# Patient Record
Sex: FEMALE | Race: OTHER
[De-identification: names, ages, dates, MRNs, and addresses within clinical notes are randomized per-mention and may not be internally consistent; named-entity substitution may affect disease eponyms.]

---
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
## 2018-04-29 ENCOUNTER — HOSPITAL ENCOUNTER (EMERGENCY)
Dept: HOSPITAL 62 - ER | Age: 18
Discharge: HOME | End: 2018-04-29
Payer: SELF-PAY

## 2018-04-29 VITALS — SYSTOLIC BLOOD PRESSURE: 109 MMHG | DIASTOLIC BLOOD PRESSURE: 75 MMHG

## 2018-04-29 DIAGNOSIS — R10.2: Primary | ICD-10-CM

## 2018-04-29 DIAGNOSIS — N93.9: ICD-10-CM

## 2018-04-29 DIAGNOSIS — E34.3: ICD-10-CM

## 2018-04-29 DIAGNOSIS — R07.9: ICD-10-CM

## 2018-04-29 DIAGNOSIS — N89.8: ICD-10-CM

## 2018-04-29 DIAGNOSIS — R10.9: ICD-10-CM

## 2018-04-29 LAB
ADD MANUAL DIFF: NO
ALBUMIN SERPL-MCNC: 4.7 G/DL (ref 3.7–5.6)
ALP SERPL-CCNC: 60 U/L (ref 50–135)
ALT SERPL-CCNC: 24 U/L (ref 5–35)
ANION GAP SERPL CALC-SCNC: 13 MMOL/L (ref 5–19)
APPEARANCE UR: (no result)
APTT PPP: YELLOW S
AST SERPL-CCNC: 19 U/L (ref 5–30)
BACTERIA (WET MOUNT): (no result)
BASOPHILS # BLD AUTO: 0.1 10^3/UL (ref 0–0.2)
BASOPHILS NFR BLD AUTO: 0.8 % (ref 0–2)
BILIRUB DIRECT SERPL-MCNC: 0.2 MG/DL (ref 0–0.4)
BILIRUB SERPL-MCNC: 0.3 MG/DL (ref 0.2–1.3)
BILIRUB UR QL STRIP: NEGATIVE
BUN SERPL-MCNC: 13 MG/DL (ref 7–20)
CALCIUM: 9.7 MG/DL (ref 8.4–10.2)
CHLAM PCR: DETECTED
CHLORIDE SERPL-SCNC: 106 MMOL/L (ref 98–107)
CO2 SERPL-SCNC: 22 MMOL/L (ref 22–30)
EOSINOPHIL # BLD AUTO: 0.2 10^3/UL (ref 0–0.6)
EOSINOPHIL NFR BLD AUTO: 2 % (ref 0–6)
EPITHELIALS (WET MOUNT): (no result)
ERYTHROCYTE [DISTWIDTH] IN BLOOD BY AUTOMATED COUNT: 15.9 % (ref 11.5–14)
GLUCOSE SERPL-MCNC: 83 MG/DL (ref 75–110)
GLUCOSE UR STRIP-MCNC: NEGATIVE MG/DL
GON PCR: DETECTED
HCT VFR BLD CALC: 37.7 % (ref 36–47)
HGB BLD-MCNC: 12.2 G/DL (ref 12–15.5)
KETONES UR STRIP-MCNC: NEGATIVE MG/DL
LYMPHOCYTES # BLD AUTO: 2.9 10^3/UL (ref 0.5–4.7)
LYMPHOCYTES NFR BLD AUTO: 26.6 % (ref 13–45)
MCH RBC QN AUTO: 27.4 PG (ref 27–33.4)
MCHC RBC AUTO-ENTMCNC: 32.4 G/DL (ref 32–36)
MCV RBC AUTO: 85 FL (ref 80–97)
MONOCYTES # BLD AUTO: 0.9 10^3/UL (ref 0.1–1.4)
MONOCYTES NFR BLD AUTO: 8.3 % (ref 3–13)
NEUTROPHILS # BLD AUTO: 6.9 10^3/UL (ref 1.7–8.2)
NEUTS SEG NFR BLD AUTO: 62.3 % (ref 42–78)
NITRITE UR QL STRIP: NEGATIVE
PH UR STRIP: 7 [PH] (ref 5–9)
PLATELET # BLD: 333 10^3/UL (ref 150–450)
POTASSIUM SERPL-SCNC: 4.5 MMOL/L (ref 3.6–5)
PROT SERPL-MCNC: 8.1 G/DL (ref 6.3–8.2)
PROT UR STRIP-MCNC: NEGATIVE MG/DL
RBC # BLD AUTO: 4.44 10^6/UL (ref 3.72–5.28)
RBCS (WET MOUNT): (no result)
SODIUM SERPL-SCNC: 141.3 MMOL/L (ref 137–145)
SP GR UR STRIP: 1.02
T.VAGINALIS (WET MOUNT): (no result)
TOTAL CELLS COUNTED % (AUTO): 100 %
UROBILINOGEN UR-MCNC: NEGATIVE MG/DL (ref ?–2)
WBC # BLD AUTO: 11 10^3/UL (ref 4–10.5)
WBCS (WET MOUNT): (no result)
YEAST (WET MOUNT): (no result)

## 2018-04-29 PROCEDURE — 93005 ELECTROCARDIOGRAM TRACING: CPT

## 2018-04-29 PROCEDURE — 96372 THER/PROPH/DIAG INJ SC/IM: CPT

## 2018-04-29 PROCEDURE — 93010 ELECTROCARDIOGRAM REPORT: CPT

## 2018-04-29 PROCEDURE — 36415 COLL VENOUS BLD VENIPUNCTURE: CPT

## 2018-04-29 PROCEDURE — 85025 COMPLETE CBC W/AUTO DIFF WBC: CPT

## 2018-04-29 PROCEDURE — 81001 URINALYSIS AUTO W/SCOPE: CPT

## 2018-04-29 PROCEDURE — 87210 SMEAR WET MOUNT SALINE/INK: CPT

## 2018-04-29 PROCEDURE — 87591 N.GONORRHOEAE DNA AMP PROB: CPT

## 2018-04-29 PROCEDURE — 71045 X-RAY EXAM CHEST 1 VIEW: CPT

## 2018-04-29 PROCEDURE — 99284 EMERGENCY DEPT VISIT MOD MDM: CPT

## 2018-04-29 PROCEDURE — 80053 COMPREHEN METABOLIC PANEL: CPT

## 2018-04-29 PROCEDURE — 87491 CHLMYD TRACH DNA AMP PROBE: CPT

## 2018-04-29 PROCEDURE — 81025 URINE PREGNANCY TEST: CPT

## 2018-04-29 NOTE — RADIOLOGY REPORT (SQ)
EXAM DESCRIPTION:

CHEST SINGLE VIEW



CLINICAL HISTORY:

17 years Female, chest pain



COMPARISON:

None.



NUMBER OF VIEWS/TECHNIQUE:

1/AP



FINDINGS:



Increased lung volume, clear parenchyma, normal cardiac

silhouette, and intact bony thorax.



IMPRESSION:



No acute cardiopulmonary findings.

## 2018-04-29 NOTE — ER DOCUMENT REPORT
ED GI/





- General


Chief Complaint: Abdominal Pain


Stated Complaint: CHEST PAIN


Time Seen by Provider: 04/29/18 03:24


Notes: 


Patient is a 17 year old female that comes to the ED for chief complaint of 

vaginal bleeding and pelvic pain.  She also reports some vaginal discharge and 

intermittent discomfort with urination.  She states that she has had this 

intermittently for several months, she was seen previously once and diagnosed 

with bacterial vaginosis and treated.  She is sexually active with her 

boyfriend.  She also states that for years she gets chest pains, she states 

that she had been today as usual where she will feel a squeezing in her chest 

and she can "feel the air going through her chest" and then it resolves.  She 

denies any current symptoms of chest pain, shortness of breath, vomiting, fever

, birth control, smoking, or any daily medications.  She was born with a 

physical deformity (type of dwarfism, has abnormal extremity size).


TRAVEL OUTSIDE OF THE [place] IN LAST 30 DAYS: No





- Related Data


Allergies/Adverse Reactions: 


 





No Known Allergies Allergy (Verified 05/11/17 11:27)


 











Past Medical History





- General


Information source: Patient





- Social History


Smoking Status: Never Smoker


Frequency of alcohol use: None


Drug Abuse: None


Lives with: Family


Family History: Reviewed & Not Pertinent


Patient has suicidal ideation: No


Patient has homicidal ideation: No


Renal/ Medical History: Denies: Hx Peritoneal Dialysis


Musculoskeltal Medical History: Reports Hx Musculoskeletal Deformity


Past Surgical History: Reports: Hx Orthopedic Surgery - ankle and feet, right 

leg





- Immunizations


Immunizations up to date: Yes


Hx Diphtheria, Pertussis, Tetanus Vaccination: Yes





Review of Systems





- Review of Systems


Constitutional: No symptoms reported


EENT: No symptoms reported


Cardiovascular: See HPI


Respiratory: See HPI


Gastrointestinal: See HPI


Genitourinary: See HPI


Female Genitourinary: See HPI


Musculoskeletal: No symptoms reported


Skin: No symptoms reported


Hematologic/Lymphatic: No symptoms reported


Neurological/Psychological: No symptoms reported





Physical Exam





- Vital signs


Vitals: 


 











Temp Pulse Resp BP Pulse Ox


 


 97.9 F   69   18   116/73   100 


 


 04/29/18 01:47  04/29/18 01:47  04/29/18 01:47  04/29/18 01:47  04/29/18 01:47











Interpretation: Normal





- General


General appearance: Appears well, Alert


In distress: None





- HEENT


Head: Normocephalic, Atraumatic


Eyes: Normal


Pupils: PERRL





- Respiratory


Respiratory status: No respiratory distress


Chest status: Nontender


Breath sounds: Normal


Chest palpation: Normal





- Cardiovascular


Rhythm: Regular


Heart sounds: Normal auscultation


Murmur: No





- Abdominal


Inspection: Normal


Distension: No distension


Bowel sounds: Normal


Tenderness: Tender - Mild pelvic tenderness generally, nonspecific, no guarding


Organomegaly: No organomegaly





- Back


Back: Normal, Nontender.  No: Tender





- Extremities


General upper extremity: Nontender, Normal ROM, Normal strength.  No: Normal 

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


General lower extremity: Nontender, Normal ROM, Normal strength.  No: Normal 

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





- Neurological


Neuro grossly intact: Yes


Cognition: Normal


Orientation: AAOx4


[place] Coma Scale Eye Opening: Spontaneous


[place] Coma Scale Verbal: Oriented


[place] Coma Scale Motor: Obeys Commands


[place] Coma Scale Total: 15


Speech: Normal


Motor strength normal: LUE, RUE, LLE, RLE


Sensory: Normal





- Psychological


Associated symptoms: Normal affect, Normal mood





- Skin


Skin Temperature: Warm


Skin Moisture: Dry


Skin Color: Normal





Course





- Re-evaluation


Re-evalutation: 


EKG and chest x-ray unremarkable.  Patient reports her chest pain is chronic 

for her entire life.  Very low suspicion of PE, ACS, aortic dissection.  

Patient is well-appearing and does not appear to be in any distress.  She does 

have some mild lower abdominal tenderness, he has yellowish discharge on pelvic 

exam, no overt cervical motion tenderness, no fever, vital signs unremarkable.





Wet mount is consistent with pelvic infection.  CBC, chemistry unremarkable.





Patient positive for both gonorrhea and chlamydia.  Treated for both, treating 

with Flagyl at home, discussed findings, recommendations, follow-up, return 

precautions.  Patient states understanding and agreement.





- Vital Signs


Vital signs: 


 











Temp Pulse Resp BP Pulse Ox


 


 98.0 F   75   18   109/75   99 


 


 04/29/18 07:08  04/29/18 07:08  04/29/18 07:08  04/29/18 07:08  04/29/18 07:08














- Laboratory


Result Diagrams: 


 04/29/18 05:23





 04/29/18 05:23


Laboratory results interpreted by me: 


 











  04/29/18 04/29/18 04/29/18





  04:12 04:44 05:23


 


WBC    11.0 H


 


RDW    15.9 H


 


Creatinine   


 


Ur Leukocyte Esterase  SMALL H  


 


Chlamydia DNA (PCR)   DETECTED H 


 


N.gonorrhoeae DNA (PCR)   DETECTED H 














  04/29/18





  05:23


 


WBC 


 


RDW 


 


Creatinine  0.37 L


 


Ur Leukocyte Esterase 


 


Chlamydia DNA (PCR) 


 


N.gonorrhoeae DNA (PCR) 














Discharge





- Discharge


Clinical Impression: 


 Pelvic pain, Vaginal discharge





Chest pain


Qualifiers:


 Chest pain type: unspecified Qualified Code(s): R07.9 - Chest pain, unspecified





Condition: Stable


Disposition: HOME, SELF-CARE


Additional Instructions: 


You have a pelvic infection, gonorrhea and Chlamydia are positive, you have 

been treated for both.  Complete the treatment for pelvic infection with the 

Flagyl as prescribed.  No intercourse for 7 days.  Your partner also needs to 

be treated.





Your chest x-ray and EKG do not show any concerning abnormalities.  Follow-up 

with primary care for additional evaluation and management.





Return to the emergency department for any concerning or worsening symptoms 

including worsening pain, fever, vomiting, or any other concerning or worsening 

symptoms.


Prescriptions: 


Metronidazole [Flagyl 500 mg Tablet] 500 mg PO BID #14 tablet


Forms:  Return to Work

## 2018-06-18 ENCOUNTER — HOSPITAL ENCOUNTER (EMERGENCY)
Dept: HOSPITAL 62 - ER | Age: 18
Discharge: HOME | End: 2018-06-18
Payer: SELF-PAY

## 2018-06-18 VITALS — SYSTOLIC BLOOD PRESSURE: 106 MMHG | DIASTOLIC BLOOD PRESSURE: 62 MMHG

## 2018-06-18 DIAGNOSIS — N30.01: Primary | ICD-10-CM

## 2018-06-18 LAB
APPEARANCE UR: (no result)
APTT PPP: YELLOW S
BILIRUB UR QL STRIP: NEGATIVE
GLUCOSE UR STRIP-MCNC: NEGATIVE MG/DL
KETONES UR STRIP-MCNC: (no result) MG/DL
NITRITE UR QL STRIP: NEGATIVE
PH UR STRIP: 5 [PH] (ref 5–9)
PROT UR STRIP-MCNC: 100 MG/DL
SP GR UR STRIP: 1.02
UROBILINOGEN UR-MCNC: NEGATIVE MG/DL (ref ?–2)

## 2018-06-18 PROCEDURE — 99284 EMERGENCY DEPT VISIT MOD MDM: CPT

## 2018-06-18 PROCEDURE — 81025 URINE PREGNANCY TEST: CPT

## 2018-06-18 PROCEDURE — 81001 URINALYSIS AUTO W/SCOPE: CPT

## 2018-06-18 NOTE — ER DOCUMENT REPORT
ED GI/





- General


Chief Complaint: Urinary Problem


Stated Complaint: VAGINAL BLEEDING


Time Seen by Provider: 06/18/18 21:40


Mode of Arrival: Ambulatory


Information source: Patient


TRAVEL OUTSIDE OF THE [place] IN LAST 30 DAYS: No





- HPI


Patient complains to provider of: Dysuria


Onset: Yesterday


Notes: 





06/18/18 22:37


Patient is here with complaints of dysuria, hematuria, urinary frequency and 

decreased urine output.  Patient states this started yesterday.  She is 

currently on her menstrual cycle, but states that is soon to be ending.  Since 

yesterday she has had some dysuria, urinary frequency and decreased urine 

output at times.  She is able to urinate.  She is on no blood thinners.  She 

denies fever.  She denies abdominal pain.  She denies nausea, vomiting, 

diarrhea.  No rash.  No vaginal discharge.  No flank pain.  She denies any 

other complaints at this time.  Nothing makes her symptoms better, her pain is 

worse with urination.





- Related Data


Allergies/Adverse Reactions: 


 





No Known Allergies Allergy (Verified 05/11/17 11:27)


 











Past Medical History





- Social History


Smoking Status: Unknown if Ever Smoked


Family History: Reviewed & Not Pertinent


Patient has suicidal ideation: No


Patient has homicidal ideation: No


Renal/ Medical History: Denies: Hx Peritoneal Dialysis


Musculoskeltal Medical History: Reports Hx Musculoskeletal Deformity


Past Surgical History: Reports: Hx Orthopedic Surgery - ankle and feet, right 

leg





- Immunizations


Immunizations up to date: Yes


Hx Diphtheria, Pertussis, Tetanus Vaccination: Yes





Review of Systems





- Review of Systems


-: Yes All other systems reviewed and negative





Physical Exam





- Vital signs


Vitals: 





 











Temp Pulse Resp BP Pulse Ox


 


 97.3 F   84   18   103/65   98 


 


 06/18/18 20:30  06/18/18 20:30  06/18/18 20:30  06/18/18 20:30  06/18/18 20:30














- Notes


Notes: 





GENERAL: alert, cooperative, nontoxic, no distress.


HEAD: normocephalic, atraumatic


EYES: conjunctiva pink without discharge, no external redness or swelling.


EARS: no external swelling, no external redness


NOSE: atraumatic, no external swelling


MOUTH/THROAT: mucous membranes moist and pink, posterior pharynx without 

erythema, swelling, exudate. No trismus or drooling.


NECK: soft, supple, full range of motion, no meningismus.


CHEST: no distress, lungs clear and equal throughout.  No wheezing, rales, 

rhonchi.


CARDIAC: regular rate and rhythm, no murmur, normal capillary refill, normal 

pulses.  No peripheral edema noted.


ABDOMEN: Soft, nontender.  No rebound tenderness or guarding.  No mass.


BACK: full range of motion, no CVA tenderness.


EXTREMITIES: full range of motion of all extremities.  No redness, no swelling.


NEURO: alert and oriented x 3, no focal deficits, full range of motion of all 

extremities.


PYSCH: appropriate mood, affect.  Patient is cooperative.


SKIN: pink, warm, dry, no rash.





Course





- Re-evaluation


Re-evalutation: 





06/18/18 22:38


Patient is nontoxic-appearing with stable vitals.  She is here with complaints 

of dysuria, hematuria, urinary frequency and decreased urine amount.  Is been 

going on since yesterday.  She denies any abdominal pain.  She has no abdominal 

tenderness on exam.  She has no fevers.  No nausea, vomiting, diarrhea.  No 

rash.  She is no CVA tenderness.  She is nontoxic appearing.  Urinalysis is 

consistent with UTI.  Urine pregnancy is negative.  The patient will be given a 

dose of Keflex and Pyridium here in emergency department will be discharged 

home on Keflex and Pyridium.  She is instructed to drink lots of water.  Follow-

up if not better in the next 3-5 days, sooner for worsening symptoms, high fever

, persistent vomiting, flank pain, or for any further concerns.





The patient's emergency department workup and current diagnosis were explained 

to the patient and or family.  Follow-up instructions were provided.  

Medications if prescribed were discussed. Instructions for when to return to 

the emergency department including specific  worrisome symptoms were discussed 

with the patient and/or family.





- Vital Signs


Vital signs: 





 











Temp Pulse Resp BP Pulse Ox


 


 97.3 F   84   18   103/65   98 


 


 06/18/18 20:30  06/18/18 20:30  06/18/18 20:30  06/18/18 20:30  06/18/18 20:30














- Laboratory


Laboratory results interpreted by me: 





 











  06/18/18





  21:10


 


Urine Protein  100 H


 


Urine Ketones  TRACE H


 


Urine Blood  LARGE H


 


Ur Leukocyte Esterase  MODERATE H














Discharge





- Discharge


Clinical Impression: 


UTI (urinary tract infection)


Qualifiers:


 Urinary tract infection type: acute cystitis Hematuria presence: with 

hematuria Qualified Code(s): N30.01 - Acute cystitis with hematuria





Condition: Stable


Disposition: HOME, SELF-CARE


Instructions:  Cephalexin ([facility]), Urinary Anesthetic Agent ([facility]), Urinary Tract 

Infection ([facility])


Additional Instructions: 


Take medications as prescribed.  Drink plenty fluids.  Follow-up if not better 

in the next 3-5 days, sooner for worsening symptoms, high fever, persistent 

vomiting, abdominal pain, or for any further concerns.


Prescriptions: 


Cephalexin Monohydrate [Keflex 500 mg Capsule] 500 mg PO BID #14 capsule


Phenazopyridine HCl [Pyridium 100 Mg Tablet] 100 mg PO TID PRN #9 tablet


 PRN Reason: 


Forms:  Parent Work Note


Referrals: 


[facility] [Provider Group] - Follow up as needed

## 2018-10-25 ENCOUNTER — HOSPITAL ENCOUNTER (EMERGENCY)
Dept: HOSPITAL 62 - ER | Age: 18
Discharge: HOME | End: 2018-10-25
Payer: SELF-PAY

## 2018-10-25 VITALS — DIASTOLIC BLOOD PRESSURE: 62 MMHG | SYSTOLIC BLOOD PRESSURE: 108 MMHG

## 2018-10-25 DIAGNOSIS — N30.00: Primary | ICD-10-CM

## 2018-10-25 DIAGNOSIS — R40.2412: ICD-10-CM

## 2018-10-25 DIAGNOSIS — R10.30: ICD-10-CM

## 2018-10-25 LAB
APPEARANCE UR: (no result)
APTT PPP: YELLOW S
BILIRUB UR QL STRIP: NEGATIVE
GLUCOSE UR STRIP-MCNC: NEGATIVE MG/DL
KETONES UR STRIP-MCNC: NEGATIVE MG/DL
NITRITE UR QL STRIP: NEGATIVE
PH UR STRIP: 7 [PH] (ref 5–9)
PROT UR STRIP-MCNC: NEGATIVE MG/DL
SP GR UR STRIP: 1.02
UROBILINOGEN UR-MCNC: 2 MG/DL (ref ?–2)

## 2018-10-25 PROCEDURE — S0119 ONDANSETRON 4 MG: HCPCS

## 2018-10-25 PROCEDURE — 99284 EMERGENCY DEPT VISIT MOD MDM: CPT

## 2018-10-25 PROCEDURE — 81001 URINALYSIS AUTO W/SCOPE: CPT

## 2018-10-25 PROCEDURE — 81025 URINE PREGNANCY TEST: CPT

## 2018-10-25 NOTE — ER DOCUMENT REPORT
ED General





- General


Chief Complaint: Abdominal Cramping


Stated Complaint: ABDOMINAL PAIN


Time Seen by Provider: 10/25/18 16:21


TRAVEL OUTSIDE OF THE [place] IN LAST 30 DAYS: No





- HPI


Patient complains to provider of: Suprapubic pain dysuria abdominal cramping


Notes: 





Patient coming in with above-stated symptoms ongoing for approximately 2 weeks.

  Patient denies any fever chills nausea denies taking any medication for her 

pain except for Midol.  Patient states that she is not pregnant at this time.  

Denies any vaginal discharge or vaginal bleeding.  Patient resting comfortably 

upon my evaluation.  States slight nausea





- Related Data


Allergies/Adverse Reactions: 


 





No Known Allergies Allergy (Verified 10/25/18 15:43)


 











Past Medical History





- Social History


Smoking Status: Never Smoker


Frequency of alcohol use: None


Drug Abuse: None


Family History: Reviewed & Not Pertinent


Patient has suicidal ideation: No


Patient has homicidal ideation: No


Renal/ Medical History: Denies: Hx Peritoneal Dialysis


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


Past Surgical History: Reports: Hx Orthopedic Surgery - ankle and feet, right 

leg





- Immunizations


Immunizations up to date: Yes


Hx Diphtheria, Pertussis, Tetanus Vaccination: Yes





Review of Systems





- Review of Systems


Constitutional: No symptoms reported


EENT: No symptoms reported


Cardiovascular: No symptoms reported


Respiratory: No symptoms reported


Gastrointestinal: Abdominal pain, Nausea


Genitourinary: Dysuria


Female Genitourinary: No symptoms reported


Musculoskeletal: No symptoms reported


Skin: No symptoms reported


Hematologic/Lymphatic: No symptoms reported


Neurological/Psychological: No symptoms reported


-: Yes All other systems reviewed and negative





Physical Exam





- Vital signs


Vitals: 


 











Temp Pulse Resp BP Pulse Ox


 


 98.9 F   81   16   109/78   100 


 


 10/25/18 15:47  10/25/18 15:47  10/25/18 15:47  10/25/18 15:47  10/25/18 15:47











Interpretation: Normal





- General


General appearance: Appears well, Alert





- HEENT


Head: Normocephalic, Atraumatic


Eyes: Normal


Pupils: PERRL





- Respiratory


Respiratory status: No respiratory distress


Chest status: Nontender


Breath sounds: Normal


Chest palpation: Normal





- Cardiovascular


Rhythm: Regular


Heart sounds: Normal auscultation


Murmur: No





- Abdominal


Inspection: Normal


Distension: No distension


Bowel sounds: Normal


Tenderness: Tender - Mild tenderness suprapubic region


Organomegaly: No organomegaly





- Back


Back: Normal, Nontender





- Extremities


General upper extremity: Nontender, Normal color, Normal ROM, Normal 

temperature.  No: Normal inspection - General malformation of the upper 

extremities


General lower extremity: Normal inspection, Nontender, Normal color, Normal ROM

, Normal temperature, Normal weight bearing.  No: Melinda's sign





- Neurological


Neuro grossly intact: Yes


Cognition: Normal


Orientation: AAOx4


[place] Coma Scale Eye Opening: Spontaneous


[place] Coma Scale Verbal: Oriented


[place] Coma Scale Motor: Obeys Commands


[place] Coma Scale Total: 15


Speech: Normal


Motor strength normal: LUE, RUE, LLE, RLE


Sensory: Normal





- Psychological


Associated symptoms: Normal affect, Normal mood





- Skin


Skin Temperature: Warm


Skin Moisture: Dry


Skin Color: Normal





Course





- Re-evaluation


Re-evalutation: 





10/25/18 21:30


Bacteriuria seen with the patient's symptoms we will go ahead and treat for 

urinary tract infection possible etiology the patient's symptoms.  Patient 

discharged home follow-up primary care physician





- Vital Signs


Vital signs: 


 











Temp Pulse Resp BP Pulse Ox


 


 98.9 F   61   16   108/62   98 


 


 10/25/18 18:53  10/25/18 18:53  10/25/18 15:47  10/25/18 18:53  10/25/18 18:53














- Laboratory


Laboratory results interpreted by me: 


 











  10/25/18





  16:46


 


Urine Urobilinogen  2.0 H


 


Ur Leukocyte Esterase  SMALL H














Discharge





- Discharge


Clinical Impression: 


Abdominal pain


Qualifiers:


 Abdominal location: lower abdomen, unspecified Qualified Code(s): R10.30 - 

Lower abdominal pain, unspecified





UTI (urinary tract infection)


Qualifiers:


 Urinary tract infection type: acute cystitis Hematuria presence: without 

hematuria Qualified Code(s): N30.00 - Acute cystitis without hematuria





Condition: Good


Disposition: HOME, SELF-CARE


Instructions:  Abdominal Pain ([facility]), Nitrofurantoin ([facility]), Urinary Tract 

Infection ([facility])


Additional Instructions: 


Your evaluation today shows signs of a urinary tract infection which could 

explain some of your urinary symptoms and abdominal pain.  We recommend taking 

Tylenol Motrin for pain control please take the antibiotic as prescribed 

Macrobid return to ER if symptoms worsen follow-up with your primary care 

physician.


Prescriptions: 


Ibuprofen [Motrin 600 mg Tablet] 600 mg PO Q8HP PRN #21 tablet


 PRN Reason: 


Nitrofurantoin/Nitrofuran Mac [Macrobid 100 mg Capsule] 1 tab PO BID #20 capsule


Ondansetron [Zofran Odt] 4 mg PO Q6 PRN #30 tab.rapdis


 PRN Reason: For Nausea/Vomiting


Forms:  Return to Work

## 2019-01-14 ENCOUNTER — HOSPITAL ENCOUNTER (EMERGENCY)
Dept: HOSPITAL 62 - ER | Age: 19
Discharge: HOME | End: 2019-01-14
Payer: SELF-PAY

## 2019-01-14 VITALS — DIASTOLIC BLOOD PRESSURE: 68 MMHG | SYSTOLIC BLOOD PRESSURE: 105 MMHG

## 2019-01-14 DIAGNOSIS — N93.8: Primary | ICD-10-CM

## 2019-01-14 LAB
ADD MANUAL DIFF: NO
ALBUMIN SERPL-MCNC: 4.5 G/DL (ref 3.5–5)
ALP SERPL-CCNC: 62 U/L (ref 38–126)
ALT SERPL-CCNC: 16 U/L (ref 9–52)
ANION GAP SERPL CALC-SCNC: 7 MMOL/L (ref 5–19)
APPEARANCE UR: (no result)
APTT PPP: YELLOW S
AST SERPL-CCNC: 19 U/L (ref 14–36)
BASOPHILS # BLD AUTO: 0.1 10^3/UL (ref 0–0.2)
BASOPHILS NFR BLD AUTO: 0.7 % (ref 0–2)
BILIRUB DIRECT SERPL-MCNC: 0.2 MG/DL (ref 0–0.4)
BILIRUB SERPL-MCNC: 0.4 MG/DL (ref 0.2–1.3)
BILIRUB UR QL STRIP: NEGATIVE
BUN SERPL-MCNC: 8 MG/DL (ref 7–20)
CALCIUM: 9.2 MG/DL (ref 8.4–10.2)
CHLORIDE SERPL-SCNC: 107 MMOL/L (ref 98–107)
CO2 SERPL-SCNC: 24 MMOL/L (ref 22–30)
EOSINOPHIL # BLD AUTO: 0.2 10^3/UL (ref 0–0.6)
EOSINOPHIL NFR BLD AUTO: 2.5 % (ref 0–6)
ERYTHROCYTE [DISTWIDTH] IN BLOOD BY AUTOMATED COUNT: 16.4 % (ref 11.5–14)
GLUCOSE SERPL-MCNC: 91 MG/DL (ref 75–110)
GLUCOSE UR STRIP-MCNC: NEGATIVE MG/DL
HCT VFR BLD CALC: 37.3 % (ref 36–47)
HGB BLD-MCNC: 12.5 G/DL (ref 12–15.5)
KETONES UR STRIP-MCNC: NEGATIVE MG/DL
LYMPHOCYTES # BLD AUTO: 2 10^3/UL (ref 0.5–4.7)
LYMPHOCYTES NFR BLD AUTO: 27.8 % (ref 13–45)
MCH RBC QN AUTO: 28.5 PG (ref 27–33.4)
MCHC RBC AUTO-ENTMCNC: 33.6 G/DL (ref 32–36)
MCV RBC AUTO: 85 FL (ref 80–97)
MONOCYTES # BLD AUTO: 0.5 10^3/UL (ref 0.1–1.4)
MONOCYTES NFR BLD AUTO: 6.4 % (ref 3–13)
NEUTROPHILS # BLD AUTO: 4.4 10^3/UL (ref 1.7–8.2)
NEUTS SEG NFR BLD AUTO: 62.6 % (ref 42–78)
NITRITE UR QL STRIP: NEGATIVE
PH UR STRIP: 5 [PH] (ref 5–9)
PLATELET # BLD: 250 10^3/UL (ref 150–450)
POTASSIUM SERPL-SCNC: 4 MMOL/L (ref 3.6–5)
PROT SERPL-MCNC: 7.6 G/DL (ref 6.3–8.2)
PROT UR STRIP-MCNC: NEGATIVE MG/DL
RBC # BLD AUTO: 4.4 10^6/UL (ref 3.72–5.28)
SODIUM SERPL-SCNC: 138.4 MMOL/L (ref 137–145)
SP GR UR STRIP: 1.01
TOTAL CELLS COUNTED % (AUTO): 100 %
UROBILINOGEN UR-MCNC: NEGATIVE MG/DL (ref ?–2)
WBC # BLD AUTO: 7.1 10^3/UL (ref 4–10.5)

## 2019-01-14 PROCEDURE — 85025 COMPLETE CBC W/AUTO DIFF WBC: CPT

## 2019-01-14 PROCEDURE — 80053 COMPREHEN METABOLIC PANEL: CPT

## 2019-01-14 PROCEDURE — 93976 VASCULAR STUDY: CPT

## 2019-01-14 PROCEDURE — 99284 EMERGENCY DEPT VISIT MOD MDM: CPT

## 2019-01-14 PROCEDURE — 81001 URINALYSIS AUTO W/SCOPE: CPT

## 2019-01-14 PROCEDURE — 84703 CHORIONIC GONADOTROPIN ASSAY: CPT

## 2019-01-14 PROCEDURE — 76856 US EXAM PELVIC COMPLETE: CPT

## 2019-01-14 PROCEDURE — 36415 COLL VENOUS BLD VENIPUNCTURE: CPT

## 2019-01-14 NOTE — ER DOCUMENT REPORT
ED General





- General


Chief Complaint: Vaginal Bleeding


Stated Complaint: VAGINAL BLEEDING


Time Seen by Provider: 01/14/19 13:12


Notes: 





18-year-old female patient reports she has had heavy vaginal bleeding since 

1/4/2019.  She states her periods usually last 2 weeks, but there are not heavy 

like this one.  She states she is going through about 60% or more of a large 

maxi pad every 2 hours and has been this way since she started bleeding on the 

fourth.  She also reports that this area started early, but she states date of 

her last menstrual period unknown.  She complains of chills, nausea, heavy 

bleeding, abdominal pain, flank pain.  She denies fevers, dysuria, urinary 

frequency.  She denies abnormal vaginal discharge or any high risk sexual 

contact.  She has no other symptoms.





TRAVEL OUTSIDE OF THE [place] IN LAST 30 DAYS: No





- Related Data


Allergies/Adverse Reactions: 


                                        





No Known Allergies Allergy (Verified 01/14/19 12:03)


   











Past Medical History





- General


Information source: Patient





- Social History


Smoking Status: Never Smoker


Chew tobacco use (# tins/day): No


Frequency of alcohol use: Rare


Drug Abuse: None


Family History: Reviewed & Not Pertinent


Patient has suicidal ideation: No


Patient has homicidal ideation: No


Renal/ Medical History: Denies: Hx Peritoneal Dialysis


Musculoskeletal Medical History: Reports Hx Musculoskeletal Deformity - reported

arthrogryposis.


Past Surgical History: Reports: Hx Orthopedic Surgery - ankle and feet, right 

leg





- Immunizations


Immunizations up to date: Yes


Hx Diphtheria, Pertussis, Tetanus Vaccination: Yes





Review of Systems





- Review of Systems


Constitutional: See HPI


EENT: See HPI


Cardiovascular: See HPI


Respiratory: See HPI


Gastrointestinal: See HPI


Genitourinary: See HPI


Female Genitourinary: See HPI


Musculoskeletal: No symptoms reported


Skin: No symptoms reported


Hematologic/Lymphatic: No symptoms reported


Neurological/Psychological: No symptoms reported





Physical Exam





- Vital signs


Vitals: 


                                        











Temp Pulse Resp BP Pulse Ox


 


 98.2 F   66   16   105/68   98 


 


 01/14/19 12:16  01/14/19 12:16  01/14/19 12:16  01/14/19 12:16  01/14/19 12:16














- Notes


Notes: 





Reviewed vital signs and nursing note as charted by RN. 


CONSTITUTIONAL: Well-appearing, well-nourished, acting appropriately for age   


HEAD: Normocephalic, atraumatic, no swelling


EYES: PERRL, Conjunctivae clear, no drainage, EOMI, no scleral icterus


ENT: External ears without lesions, External auditory canal is patent, airway 

patent, mucous membranes pink and moist


CARD: Regular rate and rhythm, no murmurs, no rubs, no gallops, capillary refill

< 2 seconds, symmetric pulses


RESP:   The lungs are clear to auscultation bilaterally, no wheezing, no rales, 

no rhonchi.  Respiratory rate and effort are normal, normal chest excursion.  No

respiratory distress, no retractions, no stridor, no nasal flaring, no accessory

muscle use. 


ABD/GI: Normal bowel sounds, non-distended, soft, non-tender, no rebound, no 

guarding, no palpable organomegaly


SKIN: Normal color for age and race, warm, dry, good turgor, no acute lesions 

noted


NEURO: No facial asymmetry, moves all extremities equally, motor and sensory 

function intact





Course





- Re-evaluation


Re-evalutation: 





01/14/19 17:09


18-year-old female presents for abnormal uterine bleeding.  She states that been

going on since the fourth.  She states her periods typically last 2 weeks but 

does not know her LMP.  She states she is going through a large amount of pads. 

She does not complain of any urinary symptoms.  Urinalysis did have large leuk 

esterase and blood.  Hemoglobin 12.5.  Plan is to order transvaginal ultrasound.


01/14/19 19:01


Ultrasound showed no evidence of fibroids, blood flow was normal to the ovaries,

right ovary was suggestive of polycystic ovary syndrome.  Although a definitive 

diagnosis cannot be made here it could potentially explain patient's abnormal 

uterine bleeding.  We strongly recommend patient follows up with gynecology we 

will give a referral.  Reassured patient that she is able to produce red blood 

cells and maintain homeostasis in the setting of this bleeding as her hemoglobin

was 12.5.  Patient is safe to discharge home





- Vital Signs


Vital signs: 


                                        











Temp Pulse Resp BP Pulse Ox


 


 98.2 F   66   16   105/68   98 


 


 01/14/19 12:16  01/14/19 12:16  01/14/19 12:16  01/14/19 12:16  01/14/19 12:16














- Laboratory


Result Diagrams: 


                                 01/14/19 14:38





                                 01/14/19 14:38


Laboratory results interpreted by me: 


                                        











  01/14/19 01/14/19 01/14/19





  14:22 14:38 14:38


 


RDW   16.4 H 


 


Creatinine    0.22 L


 


Urine Blood  LARGE H  


 


Ur Leukocyte Esterase  LARGE H  














Discharge





- Discharge


Clinical Impression: 


 Dysfunctional uterine bleeding





Condition: Good


Disposition: HOME, SELF-CARE


Instructions:  Dysfunctional Uterine Bleeding ([facility])


Additional Instructions: 


You are seen in the emergency department this evening for dysfunctional uterine 

bleeding.  Your ultrasound did not show any evidence of fibroids or ovarian 

cysts.  It did show findings suggestive of polycystic ovarian syndrome.  It is 

important to follow-up with gynecologist to further work this up.  If your 

bleeding becomes heavy that you go through more than 1 pad every 2 hours, you 

become lightheaded, pale, dizzy, or you pass out please immediately return to 

the emergency department

## 2019-01-14 NOTE — ER DOCUMENT REPORT
ED Medical Screen (RME)





- General


Chief Complaint: Vaginal Bleeding


Stated Complaint: VAGINAL BLEEDING


Time Seen by Provider: 01/14/19 13:12


Notes: 





18-year-old female patient reports she has had heavy vaginal bleeding since 

1/4/2019.  She states her periods usually last 2 weeks, but there are not as 

heavy as this 1.  She also reports that this one started early, but she cannot 

tell me when her last menstrual period was.








I have greeted and performed a rapid initial assessment of this patient.  A 

comprehensive ED assessment and evaluation of the patient, analysis of test 

results and completion of the medical decision making process will be conducted 

by additional ED providers.


TRAVEL OUTSIDE OF THE [place] IN LAST 30 DAYS: No





- Related Data


Allergies/Adverse Reactions: 


                                        





No Known Allergies Allergy (Verified 01/14/19 12:03)


   











Past Medical History





- Social History


Chew tobacco use (# tins/day): No


Frequency of alcohol use: Rare


Drug Abuse: None


Renal/ Medical History: Denies: Hx Peritoneal Dialysis


Musculoskeltal Medical History: Reports Hx Musculoskeletal Deformity - reported 

arthrogryposis.


Past Surgical History: Reports: Hx Orthopedic Surgery - ankle and feet, right 

leg





- Immunizations


Immunizations up to date: Yes


Hx Diphtheria, Pertussis, Tetanus Vaccination: Yes





Physical Exam





- Vital signs


Vitals: 





                                        











Temp Pulse Resp BP Pulse Ox


 


 98.2 F   66   16   105/68   98 


 


 01/14/19 12:16  01/14/19 12:16  01/14/19 12:16  01/14/19 12:16  01/14/19 12:16














Course





- Vital Signs


Vital signs: 





                                        











Temp Pulse Resp BP Pulse Ox


 


 98.2 F   66   16   105/68   98 


 


 01/14/19 12:16  01/14/19 12:16  01/14/19 12:16  01/14/19 12:16  01/14/19 12:16

## 2019-01-14 NOTE — RADIOLOGY REPORT (SQ)
EXAM DESCRIPTION:  U/S NON OB PEL W/DOPPLER



COMPLETED DATE/TIME:  1/14/2019 6:06 pm



REASON FOR STUDY:  dysfunctional vaginal bleeding   LMP 1/4/2019, still bleeding.



COMPARISON:  None.



TECHNIQUE:  Dynamic and static grayscale images acquired of the pelvis via transabdominal approach an
d recorded on PACS. Additional selected color Doppler and spectral images recorded.



LIMITATIONS:  None.



FINDINGS:  UTERUS: Contour normal.  No mass.

ENDOMETRIAL STRIPE: No focal or generalized thickening. No masses.

CERVIX: 2.2 cm.  No nabothian cysts.

RIGHT OVARY AND DOPPLER: Normal size. No worrisome masses. Normal arterial vascular flow without evid
ence for torsion.  Multiple follicular cysts oriented peripherally.

LEFT OVARY AND DOPPLER: Normal size. No worrisome masses. Normal arterial vascular flow without evide
nce for torsion.

FREE FLUID: There is a small amount of free fluid.

OTHER: No other significant finding.

MEASUREMENTS:

UTERUS: 6.1 x 3.1 x 4.3 cm.

ENDOMETRIAL STRIPE: 10 mm.

RIGHT OVARY: 3.8 x 2.3 x 2.3 cm.

LEFT OVARY: 3.2 x 2.1 x 3.2 cm.



IMPRESSION:  The appearance of the right ovary suggestive of polycystic ovarian syndrome.  No other s
ignificant abnormality is seen.



TECHNICAL DOCUMENTATION:  JOB ID:  [id number]

 [employer]- All Rights Reserved                           Rev-[id number]



Reading location - IP/workstation name: [email or web address]

## 2019-02-11 ENCOUNTER — HOSPITAL ENCOUNTER (EMERGENCY)
Dept: HOSPITAL 62 - ER | Age: 19
LOS: 1 days | Discharge: HOME | End: 2019-02-12
Payer: SELF-PAY

## 2019-02-11 DIAGNOSIS — R10.2: Primary | ICD-10-CM

## 2019-02-11 DIAGNOSIS — N89.8: ICD-10-CM

## 2019-02-11 DIAGNOSIS — Z87.891: ICD-10-CM

## 2019-02-11 DIAGNOSIS — R30.9: ICD-10-CM

## 2019-02-11 DIAGNOSIS — R10.30: ICD-10-CM

## 2019-02-11 LAB
ADD MANUAL DIFF: NO
ANION GAP SERPL CALC-SCNC: 9 MMOL/L (ref 5–19)
APPEARANCE UR: (no result)
APTT PPP: YELLOW S
BASOPHILS # BLD AUTO: 0.1 10^3/UL (ref 0–0.2)
BASOPHILS NFR BLD AUTO: 0.8 % (ref 0–2)
BILIRUB UR QL STRIP: NEGATIVE
BUN SERPL-MCNC: 9 MG/DL (ref 7–20)
CALCIUM: 9.2 MG/DL (ref 8.4–10.2)
CHLORIDE SERPL-SCNC: 108 MMOL/L (ref 98–107)
CO2 SERPL-SCNC: 23 MMOL/L (ref 22–30)
EOSINOPHIL # BLD AUTO: 0.1 10^3/UL (ref 0–0.6)
EOSINOPHIL NFR BLD AUTO: 1.7 % (ref 0–6)
ERYTHROCYTE [DISTWIDTH] IN BLOOD BY AUTOMATED COUNT: 15 % (ref 11.5–14)
GLUCOSE SERPL-MCNC: 81 MG/DL (ref 75–110)
GLUCOSE UR STRIP-MCNC: NEGATIVE MG/DL
HCT VFR BLD CALC: 35.5 % (ref 36–47)
HGB BLD-MCNC: 12.2 G/DL (ref 12–15.5)
KETONES UR STRIP-MCNC: NEGATIVE MG/DL
LYMPHOCYTES # BLD AUTO: 2.3 10^3/UL (ref 0.5–4.7)
LYMPHOCYTES NFR BLD AUTO: 27.4 % (ref 13–45)
MCH RBC QN AUTO: 29.1 PG (ref 27–33.4)
MCHC RBC AUTO-ENTMCNC: 34.3 G/DL (ref 32–36)
MCV RBC AUTO: 85 FL (ref 80–97)
MONOCYTES # BLD AUTO: 0.6 10^3/UL (ref 0.1–1.4)
MONOCYTES NFR BLD AUTO: 6.7 % (ref 3–13)
NEUTROPHILS # BLD AUTO: 5.4 10^3/UL (ref 1.7–8.2)
NEUTS SEG NFR BLD AUTO: 63.4 % (ref 42–78)
NITRITE UR QL STRIP: NEGATIVE
PH UR STRIP: 5 [PH] (ref 5–9)
PLATELET # BLD: 268 10^3/UL (ref 150–450)
POTASSIUM SERPL-SCNC: 4.2 MMOL/L (ref 3.6–5)
PROT UR STRIP-MCNC: NEGATIVE MG/DL
RBC # BLD AUTO: 4.18 10^6/UL (ref 3.72–5.28)
SODIUM SERPL-SCNC: 139.6 MMOL/L (ref 137–145)
SP GR UR STRIP: 1.02
TOTAL CELLS COUNTED % (AUTO): 100 %
UROBILINOGEN UR-MCNC: NEGATIVE MG/DL (ref ?–2)
WBC # BLD AUTO: 8.6 10^3/UL (ref 4–10.5)

## 2019-02-11 PROCEDURE — 81025 URINE PREGNANCY TEST: CPT

## 2019-02-11 PROCEDURE — 85025 COMPLETE CBC W/AUTO DIFF WBC: CPT

## 2019-02-11 PROCEDURE — 36415 COLL VENOUS BLD VENIPUNCTURE: CPT

## 2019-02-11 PROCEDURE — 87086 URINE CULTURE/COLONY COUNT: CPT

## 2019-02-11 PROCEDURE — 87210 SMEAR WET MOUNT SALINE/INK: CPT

## 2019-02-11 PROCEDURE — 81001 URINALYSIS AUTO W/SCOPE: CPT

## 2019-02-11 PROCEDURE — 96372 THER/PROPH/DIAG INJ SC/IM: CPT

## 2019-02-11 PROCEDURE — 87491 CHLMYD TRACH DNA AMP PROBE: CPT

## 2019-02-11 PROCEDURE — 80048 BASIC METABOLIC PNL TOTAL CA: CPT

## 2019-02-11 PROCEDURE — 99283 EMERGENCY DEPT VISIT LOW MDM: CPT

## 2019-02-11 PROCEDURE — 87591 N.GONORRHOEAE DNA AMP PROB: CPT

## 2019-02-11 PROCEDURE — 96374 THER/PROPH/DIAG INJ IV PUSH: CPT

## 2019-02-11 NOTE — ER DOCUMENT REPORT
ED Medical Screen (RME)





- General


Chief Complaint: Abdominal Pain


Stated Complaint: URINARY PAIN


Time Seen by Provider: 02/11/19 21:24


Notes: 





18-year-old female with chief complaint of 3 days of worsening pain in her lower

abdomen, worse on the right, she states that she felt feverish earlier and felt 

nausea earlier as well.  Denies vomiting, flank pain some dysuria, she does have

some vaginal discharge, she denies vaginal bleeding.


TRAVEL OUTSIDE OF THE [place] IN LAST 30 DAYS: No





- Related Data


Allergies/Adverse Reactions: 


                                        





No Known Allergies Allergy (Verified 01/14/19 12:03)


   











Past Medical History


Renal/ Medical History: Denies: Hx Peritoneal Dialysis


Musculoskeltal Medical History: Reports Hx Musculoskeletal Deformity - reported 

arthrogryposis.


Past Surgical History: Reports: Hx Orthopedic Surgery - ankle and feet, right 

leg





- Immunizations


Immunizations up to date: Yes


Hx Diphtheria, Pertussis, Tetanus Vaccination: Yes





Physical Exam





- Vital signs


Vitals: 





                                        











Temp Pulse Resp BP Pulse Ox


 


 98.7 F   95   17   104/67   96 


 


 02/11/19 20:33  02/11/19 20:33  02/11/19 20:33  02/11/19 20:33  02/11/19 20:33














- General


General appearance: Appears well


In distress: None





- Abdominal


Tenderness: Tender - Generalized lower abdominal tenderness, tenderness seems 

equal on both side but exam is limited by sitting position





Course





- Re-evaluation


Re-evalutation: 


I have greeted and performed a rapid initial assessment of this patient.  A 

comprehensive ED assessment and evaluation of the patient, analysis of test 

results and completion of the medical decision making process will be conducted 

by additional ED providers.





- Vital Signs


Vital signs: 





                                        











Temp Pulse Resp BP Pulse Ox


 


 98.7 F   95   17   104/67   96 


 


 02/11/19 20:33  02/11/19 20:33  02/11/19 20:33  02/11/19 20:33  02/11/19 20:33

## 2019-02-12 VITALS — SYSTOLIC BLOOD PRESSURE: 137 MMHG | DIASTOLIC BLOOD PRESSURE: 78 MMHG

## 2019-02-12 LAB
BACTERIA (WET MOUNT): (no result)
CHLAM PCR: DETECTED
GON PCR: DETECTED
RBCS (WET MOUNT): (no result)
T.VAGINALIS (WET MOUNT): (no result)
WBCS (WET MOUNT): (no result)
YEAST (WET MOUNT): (no result)

## 2019-02-12 NOTE — ER DOCUMENT REPORT
ED General





- General


Chief Complaint: Abdominal Pain


Stated Complaint: URINARY PAIN


Time Seen by Provider: 02/11/19 21:24


TRAVEL OUTSIDE OF THE [place] IN LAST 30 DAYS: No





- HPI


Notes: 





Patient presents to the emergency department for evaluation of lower abdominal 

pain.  She describes it is urinary pain, but states she actually has more pain 

when she is not urinating.  Urine seems to help her pain.  She is sexually 

active.  She does not always use protection.  She describes some increase in 

vaginal discharge.  No fevers or chills.  No nausea or vomiting.





- Related Data


Allergies/Adverse Reactions: 


                                        





No Known Allergies Allergy (Verified 01/14/19 12:03)


   











Past Medical History





- General


Information source: Patient





- Social History


Smoking Status: Former Smoker


Chew tobacco use (# tins/day): No


Frequency of alcohol use: None


Drug Abuse: None


Family History: Reviewed & Not Pertinent


Patient has suicidal ideation: No


Patient has homicidal ideation: No


Renal/ Medical History: Denies: Hx Peritoneal Dialysis


Musculoskeletal Medical History: Reports Hx Musculoskeletal Deformity - reported

arthrogryposis.


Past Surgical History: Reports: Hx Orthopedic Surgery - ankle and feet, right 

leg





- Immunizations


Immunizations up to date: Yes


Hx Diphtheria, Pertussis, Tetanus Vaccination: Yes





Review of Systems





- Review of Systems


Constitutional: No symptoms reported


EENT: No symptoms reported


Cardiovascular: No symptoms reported


Respiratory: No symptoms reported


Gastrointestinal: Abdominal pain


Female Genitourinary: Vaginal discharge





Physical Exam





- Vital signs


Vitals: 





                                        











Temp Pulse Resp BP Pulse Ox


 


 98.7 F   95   17   104/67   96 


 


 02/11/19 20:33  02/11/19 20:33  02/11/19 20:33  02/11/19 20:33  02/11/19 20:33











Interpretation: Normal





- Notes


Notes: 





Underdeveloped extremities as consistent with her medical history.  Head is 

normocephalic and atraumatic.  Pupils are equal round reactive to light.  Heart 

is regular rate and rhythm.  Lungs are clear to auscultation bilaterally.  

Abdomen is soft with some very mild right pelvic tenderness to palpation.  No 

rebound or guarding.  External genitalia is appropriate for Joseluis staging.  

Vaginal vault reveals a copious amount of discharge without foul odor.  Cervix 

is closed.  No cervical motion tenderness.  No adnexal masses palpated, no 

adnexal tenderness.  Skin is warm and dry.





Course





- Re-evaluation


Re-evalutation: 





02/12/19 01:36


Patient presents emergency department for evaluation.  Laboratory investigations

reveal large leukocytes in her urine, but her symptoms are not consistent with 

urinary tract infection.  I do suspect more strongly at STD is responsible for 

her symptoms.  Findings were explained to the patient.  She did elect to accept 

treatment for gonorrhea and chlamydia.  These were administered here.  Testing 

is still pending at this time.  She understands she will be contacted with a p

ositive result.  Otherwise she is to follow-up with her primary care physician 

and return to the emergency department with worsening or new concerning symptoms

of any sort.





- Vital Signs


Vital signs: 





                                        











Temp Pulse Resp BP Pulse Ox


 


 98.7 F   95   17   104/67   96 


 


 02/11/19 20:33  02/11/19 20:33  02/11/19 20:33  02/11/19 20:33  02/11/19 20:33














- Laboratory


Result Diagrams: 


                                 02/11/19 23:36





                                 02/11/19 22:20


Laboratory results interpreted by me: 





                                        











  02/11/19 02/11/19 02/11/19





  22:20 22:50 23:36


 


Hct    35.5 L


 


RDW    15.0 H


 


Chloride  108 H  


 


Creatinine  0.21 L  


 


Urine Blood   SMALL H 


 


Ur Leukocyte Esterase   LARGE H 














Discharge





- Discharge


Clinical Impression: 


 Pelvic pain, Vaginal discharge





Additional Instructions: 


Follow-up with your primary care physician next week.  You have been medicated 

for possible STDs.  These tests are pending and he will be contacted if they are

positive.  Return to the emergency department with worsening or new concerning 

symptoms.

## 2020-04-22 ENCOUNTER — HOSPITAL ENCOUNTER (EMERGENCY)
Dept: HOSPITAL 62 - ER | Age: 20
LOS: 1 days | Discharge: HOME | End: 2020-04-23
Payer: SELF-PAY

## 2020-04-22 DIAGNOSIS — R07.9: ICD-10-CM

## 2020-04-22 DIAGNOSIS — O26.91: Primary | ICD-10-CM

## 2020-04-22 DIAGNOSIS — R06.02: ICD-10-CM

## 2020-04-22 DIAGNOSIS — Z3A.01: ICD-10-CM

## 2020-04-22 DIAGNOSIS — R10.2: ICD-10-CM

## 2020-04-22 PROCEDURE — 76817 TRANSVAGINAL US OBSTETRIC: CPT

## 2020-04-22 PROCEDURE — 84702 CHORIONIC GONADOTROPIN TEST: CPT

## 2020-04-22 PROCEDURE — 93976 VASCULAR STUDY: CPT

## 2020-04-22 PROCEDURE — 36415 COLL VENOUS BLD VENIPUNCTURE: CPT

## 2020-04-22 PROCEDURE — 80053 COMPREHEN METABOLIC PANEL: CPT

## 2020-04-22 PROCEDURE — 99284 EMERGENCY DEPT VISIT MOD MDM: CPT

## 2020-04-22 PROCEDURE — 81001 URINALYSIS AUTO W/SCOPE: CPT

## 2020-04-22 PROCEDURE — 85025 COMPLETE CBC W/AUTO DIFF WBC: CPT

## 2020-04-23 VITALS — SYSTOLIC BLOOD PRESSURE: 100 MMHG | DIASTOLIC BLOOD PRESSURE: 57 MMHG

## 2020-04-23 LAB
ADD MANUAL DIFF: NO
ALBUMIN SERPL-MCNC: 3.9 G/DL (ref 3.5–5)
ALP SERPL-CCNC: 44 U/L (ref 38–126)
ANION GAP SERPL CALC-SCNC: 6 MMOL/L (ref 5–19)
APPEARANCE UR: CLEAR
APTT PPP: YELLOW S
AST SERPL-CCNC: 22 U/L (ref 14–36)
BASOPHILS # BLD AUTO: 0.1 10^3/UL (ref 0–0.2)
BASOPHILS NFR BLD AUTO: 0.7 % (ref 0–2)
BILIRUB DIRECT SERPL-MCNC: 0 MG/DL (ref 0–0.4)
BILIRUB SERPL-MCNC: 0.2 MG/DL (ref 0.2–1.3)
BILIRUB UR QL STRIP: NEGATIVE
BUN SERPL-MCNC: 10 MG/DL (ref 7–20)
CALCIUM: 8.7 MG/DL (ref 8.4–10.2)
CHLORIDE SERPL-SCNC: 106 MMOL/L (ref 98–107)
CO2 SERPL-SCNC: 22 MMOL/L (ref 22–30)
EOSINOPHIL # BLD AUTO: 0.1 10^3/UL (ref 0–0.6)
EOSINOPHIL NFR BLD AUTO: 1.7 % (ref 0–6)
ERYTHROCYTE [DISTWIDTH] IN BLOOD BY AUTOMATED COUNT: 13.5 % (ref 11.5–14)
GLUCOSE SERPL-MCNC: 88 MG/DL (ref 75–110)
GLUCOSE UR STRIP-MCNC: NEGATIVE MG/DL
HCT VFR BLD CALC: 36 % (ref 36–47)
HGB BLD-MCNC: 12.5 G/DL (ref 12–15.5)
KETONES UR STRIP-MCNC: NEGATIVE MG/DL
LYMPHOCYTES # BLD AUTO: 2.5 10^3/UL (ref 0.5–4.7)
LYMPHOCYTES NFR BLD AUTO: 31.3 % (ref 13–45)
MCH RBC QN AUTO: 31.1 PG (ref 27–33.4)
MCHC RBC AUTO-ENTMCNC: 34.6 G/DL (ref 32–36)
MCV RBC AUTO: 90 FL (ref 80–97)
MONOCYTES # BLD AUTO: 0.6 10^3/UL (ref 0.1–1.4)
MONOCYTES NFR BLD AUTO: 7.3 % (ref 3–13)
NEUTROPHILS # BLD AUTO: 4.7 10^3/UL (ref 1.7–8.2)
NEUTS SEG NFR BLD AUTO: 59 % (ref 42–78)
NITRITE UR QL STRIP: NEGATIVE
PH UR STRIP: 6 [PH] (ref 5–9)
PLATELET # BLD: 214 10^3/UL (ref 150–450)
POTASSIUM SERPL-SCNC: 4 MMOL/L (ref 3.6–5)
PROT SERPL-MCNC: 7 G/DL (ref 6.3–8.2)
PROT UR STRIP-MCNC: NEGATIVE MG/DL
RBC # BLD AUTO: 4 10^6/UL (ref 3.72–5.28)
SP GR UR STRIP: 1.02
TOTAL CELLS COUNTED % (AUTO): 100 %
UROBILINOGEN UR-MCNC: NEGATIVE MG/DL (ref ?–2)
WBC # BLD AUTO: 8 10^3/UL (ref 4–10.5)

## 2020-04-23 NOTE — RADIOLOGY REPORT (SQ)
Ultrasound OB transvaginal on 4/23/2020 at 4:36 AM



Clinical indications: Pelvic pain, seven weeks pregnant



COMPARISON: None this pregnancy



FINDINGS: Multiple sonographic images are obtained throughout the

pelvis by transvaginal approach, both transverse and sagittal

images are obtained. Uterus measures approximately 8.8 x 5.8 x 0

cm. Right ovary measures approximately 3.1 x 1.8 x 2.0 cm. Flow

is demonstrated in the right ovary. Left ovary measures

approximately 1.8 x 2.2 x 1.5 cm. Flow is demonstrated in the

left ovary. No adnexal mass or fluid collection is noted. There

is an irregular hypoechoic area in the uterus favored to

represent gestational sac although is somewhat indeterminate as

surrounding decidual reaction is difficult to confirm and there

is no yolk sac or embryo. Mean sac diameter of this measures 2.21

cm which would be consistent with an approximate seven week one

day gestation. At this size without an embryo visualized the

findings are suspicious for but not diagnostic of pregnancy

failure. Recommend correlation with the patient's beta hCG level

and appropriate OB follow-up. No free fluid is noted in the

pelvis.



IMPRESSION: Findings as above suspicious for but not diagnostic

of pregnancy failure. Recommend follow-up of the patient's

beta-hCG level and appropriate OB follow-up.

## 2020-04-23 NOTE — ER DOCUMENT REPORT
ED General





- General


Chief Complaint: Shortness Of Breath


Stated Complaint: CHEST PAIN,SHORTNESS OF BREATH


Time Seen by Provider: 04/23/20 02:58


Primary Care Provider: 


LUPE ROLAND MD [ACTIVE STAFF] - Follow up as needed


Notes: 





19-year-old 7-week pregnant female presents to the emergency department with 

multiple chief complaints.  Patient states that she has been having some 

intermittent chest pain, intermittent shortness of breath, and pelvic pain.  

Patient states that the chest pain and shortness of breath is been present for 

the last 18 hours.  No fevers or chills, no dry cough, no nausea or vomiting, no

myalgias.  Patient states that she has had some persistent "bladder pain" with 

some left-sided adnexal pain.  Patient did tell the nurse that she is having 

bilateral adnexal pain.  No abnormal vaginal discharge or vaginal bleeding.  No 

dysuria or hematuria.  Patient is sexually active with one partner.  No other 

complaints


TRAVEL OUTSIDE OF THE [place] IN LAST 30 DAYS: No





- Related Data


Allergies/Adverse Reactions: 


                                        





No Known Allergies Allergy (Verified 01/14/19 12:03)


   











Past Medical History





- Social History


Smoking Status: Never Smoker


Family History: Reviewed & Not Pertinent


Patient has suicidal ideation: No


Patient has homicidal ideation: No


Renal/ Medical History: Denies: Hx Peritoneal Dialysis


Musculoskeletal Medical History: Reports Hx Musculoskeletal Deformity - reported

arthrogryposis.


Past Surgical History: Reports: Hx Orthopedic Surgery - ankle and feet, right 

leg





- Immunizations


Immunizations up to date: Yes


Hx Diphtheria, Pertussis, Tetanus Vaccination: Yes





Review of Systems





- Review of Systems


Constitutional: See HPI


EENT: No symptoms reported


Cardiovascular: See HPI


Respiratory: See HPI


Gastrointestinal: See HPI


Genitourinary: See HPI


Female Genitourinary: See HPI


Musculoskeletal: No symptoms reported


Skin: No symptoms reported


Hematologic/Lymphatic: No symptoms reported


Neurological/Psychological: No symptoms reported





Physical Exam





- Vital signs


Vitals: 


                                        











Temp Pulse Resp BP Pulse Ox


 


 98.6 F   97   16   100/56 L  98 


 


 04/23/20 00:15  04/23/20 00:15  04/23/20 00:15  04/23/20 00:15  04/23/20 00:15














- Notes


Notes: 





PHYSICAL EXAMINATION:





Reviewed vital signs and charting by RN





GENERAL: Alert, interacts well. No acute distress.


HEAD: Normocephalic, atraumatic.


EYES: Pupils equal and round. Extraocular movements intact.


ENT: Oral mucosa moist, tongue midline. 


NECK: Full range of motion. Trachea midline.


LUNGS: Clear to auscultation bilaterally, no wheezes, rales, or rhonchi. No 

respiratory distress.


HEART: Regular rate and rhythm. No murmur


ABDOMEN: soft, mild suprapubic tenderness to palpation, left lower quadrant 

tenderness to palpation.  No distention. Bowel sounds present


EXTREMITIES: Moves all 4 extremities spontaneously. No edema,  No cyanosis.


PSYCH: Normal affect, normal mood.


SKIN: Warm, dry, normal turgor. No rashes or lesions noted.








Course





- Re-evaluation


Re-evalutation: 





04/23/20 04:30


Well-appearing in no acute distress, speaking in full sentences, vital signs and

nursing notes reviewed.  Vital signs within normal limits.  Patient currently 

asymptomatic without shortness of breath or chest pain but does complain of some

mild suprapubic tenderness to palpation.  Urinalysis was ordered, lab work 

ordered, and a transvaginal ultrasound ordered to rule out ectopic pregnancy.


04/23/20 05:59


Transvaginal ultrasound showed findings suspicious for but not diagnostic of 

pregnancy failure.  Radiologist recommends follow-up beta-hCG level.  

Quantitative beta-hCG 149,000.  Plan is for a 48-hour repeat hCG and referral to

[facility].  At this time patient is stable for discharge 

with strict return precautions.





- Vital Signs


Vital signs: 


                                        











Temp Pulse Resp BP Pulse Ox


 


 98.6 F   97   16   100/56 L  98 


 


 04/23/20 01:10  04/23/20 00:15  04/23/20 00:15  04/23/20 00:15  04/23/20 00:15














- Laboratory


Result Diagrams: 


                                 04/23/20 04:12





                                 04/23/20 04:12





Discharge





- Discharge


Clinical Impression: 


 Pelvic pain





Pregnancy


Qualifiers:


 Weeks of gestation: less than 8 weeks Qualified Code(s): Z3A.01 - Less than 8 

weeks gestation of pregnancy





Condition: Good


Disposition: HOME, SELF-CARE


Additional Instructions: 


You need to return to the ED or the women's health clinic in 48 hours for a 

recheck of your pregnancy hormone level. The ultrasound is concerning for a 

nonviable pregnancy.  There is no evidence of an ectopic pregnancy at this time.

 Please return if you develop severe abdominal pain, bleeding that goes through 

more than 2 pads for more than 2 hours, pass out, or have any other symptoms 

that are concerning to you. Please follow-up closely with your OBGYN regarding 

today's visit.  If they are not able to see you in 48 hours on Saturday morning 

then return to the emergency department for a repeat quantitative beta-hCG.


Referrals: 


LUPE ROLAND MD [ACTIVE STAFF] - 04/25/20